# Patient Record
Sex: FEMALE | Race: WHITE | NOT HISPANIC OR LATINO | Employment: OTHER | URBAN - NONMETROPOLITAN AREA
[De-identification: names, ages, dates, MRNs, and addresses within clinical notes are randomized per-mention and may not be internally consistent; named-entity substitution may affect disease eponyms.]

---

## 2024-03-08 PROBLEM — E78.5 HYPERLIPIDEMIA: Status: ACTIVE | Noted: 2024-03-08

## 2024-03-08 PROBLEM — I49.3 PREMATURE VENTRICULAR CONTRACTIONS: Status: ACTIVE | Noted: 2024-03-08

## 2024-03-08 PROBLEM — I34.1 MITRAL VALVE PROLAPSE SYNDROME: Status: ACTIVE | Noted: 2024-03-08

## 2024-03-08 PROBLEM — I47.10 SUPRAVENTRICULAR TACHYCARDIA (CMS-HCC): Status: ACTIVE | Noted: 2024-03-08

## 2024-03-08 PROBLEM — I34.0 MITRAL REGURGITATION: Status: ACTIVE | Noted: 2024-03-08

## 2024-03-08 PROBLEM — I47.20 VENTRICULAR TACHYCARDIA (MULTI): Status: ACTIVE | Noted: 2024-03-08

## 2024-03-08 RX ORDER — ESTRADIOL 1 MG/1
1 TABLET ORAL DAILY
COMMUNITY
Start: 2021-12-15

## 2024-03-08 RX ORDER — SERTRALINE HYDROCHLORIDE 100 MG/1
1 TABLET, FILM COATED ORAL DAILY
COMMUNITY
Start: 2021-12-15

## 2024-03-08 RX ORDER — PROPRANOLOL HYDROCHLORIDE 80 MG/1
1 TABLET ORAL 2 TIMES DAILY
COMMUNITY
Start: 2022-01-24 | End: 2024-04-10

## 2024-03-08 RX ORDER — ATORVASTATIN CALCIUM 10 MG/1
1 TABLET, FILM COATED ORAL NIGHTLY
COMMUNITY
End: 2024-04-10

## 2024-04-10 DIAGNOSIS — I47.10 SUPRAVENTRICULAR TACHYCARDIA (CMS-HCC): ICD-10-CM

## 2024-04-10 DIAGNOSIS — E78.2 MIXED HYPERLIPIDEMIA: ICD-10-CM

## 2024-04-10 DIAGNOSIS — I49.3 PREMATURE VENTRICULAR CONTRACTIONS: ICD-10-CM

## 2024-04-10 RX ORDER — PROPRANOLOL HYDROCHLORIDE 80 MG/1
80 TABLET ORAL 2 TIMES DAILY
Qty: 180 TABLET | Refills: 3 | Status: SHIPPED | OUTPATIENT
Start: 2024-04-10

## 2024-04-10 RX ORDER — ATORVASTATIN CALCIUM 10 MG/1
10 TABLET, FILM COATED ORAL NIGHTLY
Qty: 90 TABLET | Refills: 3 | Status: SHIPPED | OUTPATIENT
Start: 2024-04-10

## 2024-04-16 ENCOUNTER — OFFICE VISIT (OUTPATIENT)
Dept: CARDIOLOGY | Facility: CLINIC | Age: 59
End: 2024-04-16
Payer: COMMERCIAL

## 2024-04-16 VITALS
SYSTOLIC BLOOD PRESSURE: 104 MMHG | BODY MASS INDEX: 24.59 KG/M2 | HEART RATE: 68 BPM | WEIGHT: 153 LBS | DIASTOLIC BLOOD PRESSURE: 66 MMHG | HEIGHT: 66 IN

## 2024-04-16 DIAGNOSIS — I34.1 MITRAL VALVE PROLAPSE SYNDROME: ICD-10-CM

## 2024-04-16 DIAGNOSIS — I49.3 PREMATURE VENTRICULAR CONTRACTIONS: ICD-10-CM

## 2024-04-16 DIAGNOSIS — Z87.891 FORMER SMOKER: ICD-10-CM

## 2024-04-16 DIAGNOSIS — E78.5 HYPERLIPIDEMIA, UNSPECIFIED HYPERLIPIDEMIA TYPE: ICD-10-CM

## 2024-04-16 DIAGNOSIS — I34.0 MITRAL VALVE INSUFFICIENCY, UNSPECIFIED ETIOLOGY: ICD-10-CM

## 2024-04-16 DIAGNOSIS — I47.20 VENTRICULAR TACHYCARDIA (MULTI): ICD-10-CM

## 2024-04-16 DIAGNOSIS — I47.10 SUPRAVENTRICULAR TACHYCARDIA (CMS-HCC): ICD-10-CM

## 2024-04-16 PROCEDURE — 1036F TOBACCO NON-USER: CPT | Performed by: INTERNAL MEDICINE

## 2024-04-16 PROCEDURE — 99213 OFFICE O/P EST LOW 20 MIN: CPT | Performed by: INTERNAL MEDICINE

## 2024-04-16 PROCEDURE — 3008F BODY MASS INDEX DOCD: CPT | Performed by: INTERNAL MEDICINE

## 2024-04-16 NOTE — LETTER
"April 16, 2024     Marine Peacock, APRN-CNP  [address]  [facility] [address]    Patient: Kristy Arnold   YOB: 1965   Date of Visit: 4/16/2024       Dear Dr. Marine Peacock, APRN-CNP:    Thank you for referring Kristy Arnold to me for evaluation. Below are my notes for this consultation.  If you have questions, please do not hesitate to call me. I look forward to following your patient along with you.       Sincerely,     Melinda Lugo MD      CC: No Recipients  ______________________________________________________________________________________    Subjective   Kristy Arnold is a 58 y.o. female       Chief Complaint    Annual Exam          HPI   Patient is in the office for follow-up for the problems noted below.  Since her last visit last year she has had no cardiac events whatsoever.  She has no palpitations chest pain or dyspnea.  Her examination today was completely normal.  I could not hear cardiac murmur.    ASSESSMENT AND PLAN:      1. Palpitations, on beta-blocker therapy under control.  2. History of supraventricular tachycardia, well controlled on propranolol.  3. Migraine, on propranolol under control.  4. Hyperlipidemia, on statin therapy. Profile is ordered  5. Mitral valve prolapse and mild mitral regurgitation.. Last echocardiogram 2022. Reassurances were provided, no cardiac murmurs or clicks could be heard on examination today       Melinda Lugo MD, [facility]   Review of Systems   All other systems reviewed and are negative.           Vitals:    04/16/24 0937   BP: 104/66   BP Location: Left arm   Patient Position: Sitting   Pulse: 68   Weight: 69.4 kg (153 lb)   Height: 1.676 m (5' 6\")        Objective   Physical Exam  Constitutional:       Appearance: Normal appearance.   HENT:      Nose: Nose normal.   Neck:      Vascular: No carotid bruit.   Cardiovascular:      Rate and Rhythm: Normal rate.      Pulses: Normal pulses.      Heart sounds: Normal heart " sounds.   Pulmonary:      Effort: Pulmonary effort is normal.   Abdominal:      General: Bowel sounds are normal.      Palpations: Abdomen is soft.   Musculoskeletal:         General: Normal range of motion.      Cervical back: Normal range of motion.      Right lower leg: No edema.      Left lower leg: No edema.   Skin:     General: Skin is warm and dry.   Neurological:      General: No focal deficit present.      Mental Status: She is alert.   Psychiatric:         Mood and Affect: Mood normal.         Behavior: Behavior normal.         Thought Content: Thought content normal.         Judgment: Judgment normal.         Allergies  Statins-hmg-coa reductase inhibitors, Ciprofloxacin, Penicillins, Sulfa (sulfonamide antibiotics), and Vancomycin     Current Medications    Current Outpatient Medications:   •  atorvastatin (Lipitor) 10 mg tablet, TAKE 1 TABLET AT BEDTIME, Disp: 90 tablet, Rfl: 3  •  estradiol (Estrace) 1 mg tablet, Take 1 tablet (1 mg) by mouth once daily., Disp: , Rfl:   •  propranolol (Inderal) 80 mg tablet, TAKE 1 TABLET TWICE A DAY, Disp: 180 tablet, Rfl: 3  •  sertraline (Zoloft) 100 mg tablet, Take 1 tablet (100 mg) by mouth once daily., Disp: , Rfl:                      Assessment/Plan   1. Supraventricular tachycardia (CMS-HCC)  Follow Up In Cardiology      2. Premature ventricular contractions        3. Ventricular tachycardia (Multi)        4. Mitral valve prolapse syndrome        5. Mitral valve insufficiency, unspecified etiology        6. Hyperlipidemia, unspecified hyperlipidemia type        7. BMI 24.0-24.9, adult        8. Former smoker                 Scribe Attestation  By signing my name below, Emely NUNEZ LPN, Scribe   attest that this documentation has been prepared under the direction and in the presence of Melinda Lugo MD.     Provider Attestation - Scribe documentation    All medical record entries made by the Scribe were at my direction and personally dictated by me. I  have reviewed the chart and agree that the record accurately reflects my personal performance of the history, physical exam, discussion and plan.

## 2024-04-16 NOTE — PATIENT INSTRUCTIONS
Please bring all medicines, vitamins, and herbal supplements with you when you come to the office.    Prescriptions will not be filled unless you are compliant with your follow up appointments or have a follow up appointment scheduled as per instruction of your physician. Refills should be requested at the time of your visit.     Follow up one year  Same medications.

## 2024-04-16 NOTE — PROGRESS NOTES
"Subjective   Kristy Arnold is a 58 y.o. female       Chief Complaint    Annual Exam          HPI   Patient is in the office for follow-up for the problems noted below.  Since her last visit last year she has had no cardiac events whatsoever.  She has no palpitations chest pain or dyspnea.  Her examination today was completely normal.  I could not hear cardiac murmur.    ASSESSMENT AND PLAN:      1. Palpitations, on beta-blocker therapy under control.  2. History of supraventricular tachycardia, well controlled on propranolol.  3. Migraine, on propranolol under control.  4. Hyperlipidemia, on statin therapy. Profile is ordered  5. Mitral valve prolapse and mild mitral regurgitation.. Last echocardiogram 2022. Reassurances were provided, no cardiac murmurs or clicks could be heard on examination today       Melinda Lugo MD, [facility]   Review of Systems   All other systems reviewed and are negative.           Vitals:    04/16/24 0937   BP: 104/66   BP Location: Left arm   Patient Position: Sitting   Pulse: 68   Weight: 69.4 kg (153 lb)   Height: 1.676 m (5' 6\")        Objective   Physical Exam  Constitutional:       Appearance: Normal appearance.   HENT:      Nose: Nose normal.   Neck:      Vascular: No carotid bruit.   Cardiovascular:      Rate and Rhythm: Normal rate.      Pulses: Normal pulses.      Heart sounds: Normal heart sounds.   Pulmonary:      Effort: Pulmonary effort is normal.   Abdominal:      General: Bowel sounds are normal.      Palpations: Abdomen is soft.   Musculoskeletal:         General: Normal range of motion.      Cervical back: Normal range of motion.      Right lower leg: No edema.      Left lower leg: No edema.   Skin:     General: Skin is warm and dry.   Neurological:      General: No focal deficit present.      Mental Status: She is alert.   Psychiatric:         Mood and Affect: Mood normal.         Behavior: Behavior normal.         Thought Content: Thought content normal.         " Judgment: Judgment normal.         Allergies  Statins-hmg-coa reductase inhibitors, Ciprofloxacin, Penicillins, Sulfa (sulfonamide antibiotics), and Vancomycin     Current Medications    Current Outpatient Medications:     atorvastatin (Lipitor) 10 mg tablet, TAKE 1 TABLET AT BEDTIME, Disp: 90 tablet, Rfl: 3    estradiol (Estrace) 1 mg tablet, Take 1 tablet (1 mg) by mouth once daily., Disp: , Rfl:     propranolol (Inderal) 80 mg tablet, TAKE 1 TABLET TWICE A DAY, Disp: 180 tablet, Rfl: 3    sertraline (Zoloft) 100 mg tablet, Take 1 tablet (100 mg) by mouth once daily., Disp: , Rfl:                      Assessment/Plan   1. Supraventricular tachycardia (CMS-HCC)  Follow Up In Cardiology      2. Premature ventricular contractions        3. Ventricular tachycardia (Multi)        4. Mitral valve prolapse syndrome        5. Mitral valve insufficiency, unspecified etiology        6. Hyperlipidemia, unspecified hyperlipidemia type        7. BMI 24.0-24.9, adult        8. Former smoker                 Scribe Attestation  By signing my name below, Emely NUNEZ LPN, Scribe   attest that this documentation has been prepared under the direction and in the presence of Melinda Lugo MD.     Provider Attestation - Scribe documentation    All medical record entries made by the Scribe were at my direction and personally dictated by me. I have reviewed the chart and agree that the record accurately reflects my personal performance of the history, physical exam, discussion and plan.

## 2024-09-09 ENCOUNTER — TELEPHONE (OUTPATIENT)
Dept: CARDIOLOGY | Facility: CLINIC | Age: 59
End: 2024-09-09
Payer: COMMERCIAL

## 2024-09-09 NOTE — TELEPHONE ENCOUNTER
----- Message from Melinda Lugo sent at 9/6/2024  8:16 PM EDT -----  Patient is clear with no reservations  ----- Message -----  From: Lauren Almaraz  Sent: 9/6/2024   1:04 PM EDT  To: Melinda Lugo MD

## 2024-10-30 ENCOUNTER — APPOINTMENT (OUTPATIENT)
Dept: ORTHOPEDIC SURGERY | Facility: CLINIC | Age: 59
End: 2024-10-30
Payer: COMMERCIAL

## 2024-11-04 ENCOUNTER — APPOINTMENT (OUTPATIENT)
Dept: ORTHOPEDIC SURGERY | Facility: CLINIC | Age: 59
End: 2024-11-04
Payer: COMMERCIAL

## 2024-11-04 ENCOUNTER — HOSPITAL ENCOUNTER (OUTPATIENT)
Dept: RADIOLOGY | Facility: HOSPITAL | Age: 59
Discharge: HOME | End: 2024-11-04
Payer: COMMERCIAL

## 2024-11-04 VITALS — HEIGHT: 67 IN | WEIGHT: 144 LBS | BODY MASS INDEX: 22.6 KG/M2

## 2024-11-04 DIAGNOSIS — M12.812 ROTATOR CUFF ARTHROPATHY OF LEFT SHOULDER: Primary | ICD-10-CM

## 2024-11-04 DIAGNOSIS — M25.512 LEFT SHOULDER PAIN, UNSPECIFIED CHRONICITY: ICD-10-CM

## 2024-11-04 PROCEDURE — 3008F BODY MASS INDEX DOCD: CPT | Performed by: ORTHOPAEDIC SURGERY

## 2024-11-04 PROCEDURE — 99204 OFFICE O/P NEW MOD 45 MIN: CPT | Performed by: ORTHOPAEDIC SURGERY

## 2024-11-04 PROCEDURE — 1036F TOBACCO NON-USER: CPT | Performed by: ORTHOPAEDIC SURGERY

## 2024-11-04 PROCEDURE — 73030 X-RAY EXAM OF SHOULDER: CPT | Mod: LEFT SIDE | Performed by: STUDENT IN AN ORGANIZED HEALTH CARE EDUCATION/TRAINING PROGRAM

## 2024-11-04 PROCEDURE — 73030 X-RAY EXAM OF SHOULDER: CPT | Mod: LT

## 2024-11-04 NOTE — PROGRESS NOTES
History of present: Complicated past history of left shoulder failed multiple surgeries    Most recently in 2021 she has failed a superior capsular reconstruction.    She has severe dysfunction pain at all times pain with ADLs and significantly lack of motion in the left arm        Past medical history:    The patient's past medical history, family history, social history and review of systems were documented on the patient's medical intake form.  The medical intake form was reviewed and scanned into the electronic medical record for future use.  History is otherwise negative except as stated in the history of present illness.    Physical exam:    General: Alert and oriented to person place and time.  No acute distress and breathing comfortably, pleasant and cooperative with examination.    Head and neck exam: Head is normocephalic atraumatic.    Neck: Supple no visible swelling or deformity.    Cardiovascular: Good perfusion to affected extremities without signs or symptoms of chest pain.    Lungs no audible wheezing or labored breathing on examination.    Abdominal exam: Nondistended nontender    Extremities: The left shoulder was inspected and was found to have no obvious deformity.  There was tenderness to touch over the lateral edges of the shoulder over the rotator cuff insertion.  Active forward flexion 50 degrees, external rotation to 20 degrees, abduction to 41 degrees, and internal rotation to the level of anterior iliac crest.    At this time the shoulder is neurovascular tact and neurosensory intact.  Motor intact C5-T1.  There was no obvious neck pain or radiculopathy noted.  There was no gross instability or adhesive capsulitis symptoms.  There was no evidence of apprehension or apprehension suppression.    Strength was tested in 4 planes with weakness in the supraspinatus strength testing and external rotation position.  There was no strength deficit in internal rotation.  Impingement signs were  positive both supine and standing for impingement test type I and II.  There was mild pain over the bicipital groove with a positive speeds sign        Diagnostic studies: X-rays show erosive change over the greater tuberosity cystic change in the humeral head elevation of the left humeral head in the glenoid fossa consistent with early rotator cuff arthropathy left shoulder      Impression: Left shoulder early rotator cuff arthropathy    The patient has failed extensive conservative treatment including multiple injections in the past and including arthroscopic superior capsular reconstruction with augmented graft      Plan: Now for reverse shoulder replacement    Risk and benefits of surgery discussed extensively with the patient.    Surgical risk included but were not limited to infection, wear, loosening, need for further surgery blood clot, failure to heal, failure of the surgery, stiffness, loss of limb life, extremity function change in length change, and associated risks of  any surgery.    Implant longevity rehab issues we talked about a lifetime lifting repetitive limit of 10 to 20 pounds    We talked about limited internal rotation crossarm activity goals benefits risk use and rehab program to look for to be on the operative schedule we also offered additional injections which she said worked very poorly in the past and she like to proceed with replacement

## 2024-11-15 ENCOUNTER — TELEPHONE (OUTPATIENT)
Dept: CARDIOLOGY | Facility: CLINIC | Age: 59
End: 2024-11-15
Payer: COMMERCIAL

## 2024-11-15 ENCOUNTER — TELEPHONE (OUTPATIENT)
Dept: ORTHOPEDIC SURGERY | Facility: CLINIC | Age: 59
End: 2024-11-15
Payer: COMMERCIAL

## 2024-11-15 DIAGNOSIS — M12.812 ROTATOR CUFF ARTHROPATHY OF LEFT SHOULDER: ICD-10-CM

## 2024-11-15 NOTE — TELEPHONE ENCOUNTER
Patient lvm asking if she could get a muscle relaxer prescribed, has been having a lot of spasms in her shoulder.    [facility], [facility] is her preferred pharmacy.

## 2024-11-15 NOTE — TELEPHONE ENCOUNTER
Patient phoned requesting POC for reverse total shoulder to be done by  on 1/7/25. Please advise.    To Dr. Melinda Lugo MD for review.

## 2024-11-16 RX ORDER — CYCLOBENZAPRINE HCL 10 MG
10 TABLET ORAL 3 TIMES DAILY PRN
Qty: 30 TABLET | Refills: 0 | Status: SHIPPED | OUTPATIENT
Start: 2024-11-16 | End: 2024-11-26

## 2024-12-23 ENCOUNTER — APPOINTMENT (OUTPATIENT)
Dept: CARDIOLOGY | Facility: CLINIC | Age: 59
End: 2024-12-23
Payer: COMMERCIAL

## 2024-12-23 ENCOUNTER — LAB (OUTPATIENT)
Dept: LAB | Facility: LAB | Age: 59
End: 2024-12-23
Payer: COMMERCIAL

## 2024-12-23 DIAGNOSIS — Z01.818 PRE-OP TESTING: ICD-10-CM

## 2024-12-23 LAB
ALBUMIN SERPL BCP-MCNC: 4.2 G/DL (ref 3.4–5)
ALP SERPL-CCNC: 55 U/L (ref 33–136)
ALT SERPL W P-5'-P-CCNC: 12 U/L (ref 7–45)
ANION GAP SERPL CALC-SCNC: 10 MMOL/L (ref 10–20)
AST SERPL W P-5'-P-CCNC: 14 U/L (ref 9–39)
BASOPHILS # BLD AUTO: 0.05 X10*3/UL (ref 0–0.1)
BASOPHILS NFR BLD AUTO: 1 %
BILIRUB SERPL-MCNC: 1.1 MG/DL (ref 0–1.2)
BUN SERPL-MCNC: 12 MG/DL (ref 6–23)
CALCIUM SERPL-MCNC: 9 MG/DL (ref 8.6–10.3)
CHLORIDE SERPL-SCNC: 107 MMOL/L (ref 98–107)
CO2 SERPL-SCNC: 28 MMOL/L (ref 21–32)
CREAT SERPL-MCNC: 0.75 MG/DL (ref 0.5–1.05)
EGFRCR SERPLBLD CKD-EPI 2021: >90 ML/MIN/1.73M*2
EOSINOPHIL # BLD AUTO: 0.06 X10*3/UL (ref 0–0.7)
EOSINOPHIL NFR BLD AUTO: 1.2 %
ERYTHROCYTE [DISTWIDTH] IN BLOOD BY AUTOMATED COUNT: 13 % (ref 11.5–14.5)
GLUCOSE SERPL-MCNC: 98 MG/DL (ref 74–99)
HCT VFR BLD AUTO: 38.7 % (ref 36–46)
HGB BLD-MCNC: 13.4 G/DL (ref 12–16)
IMM GRANULOCYTES # BLD AUTO: 0.01 X10*3/UL (ref 0–0.7)
IMM GRANULOCYTES NFR BLD AUTO: 0.2 % (ref 0–0.9)
INR PPP: 1 (ref 0.9–1.1)
LYMPHOCYTES # BLD AUTO: 1.6 X10*3/UL (ref 1.2–4.8)
LYMPHOCYTES NFR BLD AUTO: 30.8 %
MCH RBC QN AUTO: 30.7 PG (ref 26–34)
MCHC RBC AUTO-ENTMCNC: 34.6 G/DL (ref 32–36)
MCV RBC AUTO: 89 FL (ref 80–100)
MONOCYTES # BLD AUTO: 0.35 X10*3/UL (ref 0.1–1)
MONOCYTES NFR BLD AUTO: 6.7 %
NEUTROPHILS # BLD AUTO: 3.13 X10*3/UL (ref 1.2–7.7)
NEUTROPHILS NFR BLD AUTO: 60.1 %
NRBC BLD-RTO: 0 /100 WBCS (ref 0–0)
PLATELET # BLD AUTO: 208 X10*3/UL (ref 150–450)
POTASSIUM SERPL-SCNC: 4 MMOL/L (ref 3.5–5.3)
PROT SERPL-MCNC: 6.6 G/DL (ref 6.4–8.2)
PROTHROMBIN TIME: 10.7 SECONDS (ref 9.8–12.8)
RBC # BLD AUTO: 4.37 X10*6/UL (ref 4–5.2)
SODIUM SERPL-SCNC: 141 MMOL/L (ref 136–145)
WBC # BLD AUTO: 5.2 X10*3/UL (ref 4.4–11.3)

## 2024-12-23 PROCEDURE — 80053 COMPREHEN METABOLIC PANEL: CPT

## 2024-12-23 PROCEDURE — 85610 PROTHROMBIN TIME: CPT

## 2024-12-23 PROCEDURE — 85025 COMPLETE CBC W/AUTO DIFF WBC: CPT

## 2025-01-03 DIAGNOSIS — I47.10 SUPRAVENTRICULAR TACHYCARDIA (CMS-HCC): ICD-10-CM

## 2025-01-03 DIAGNOSIS — I49.3 PREMATURE VENTRICULAR CONTRACTIONS: ICD-10-CM

## 2025-01-03 DIAGNOSIS — E78.2 MIXED HYPERLIPIDEMIA: ICD-10-CM

## 2025-01-05 RX ORDER — ATORVASTATIN CALCIUM 10 MG/1
10 TABLET, FILM COATED ORAL NIGHTLY
Qty: 90 TABLET | Refills: 3 | Status: SHIPPED | OUTPATIENT
Start: 2025-01-05

## 2025-01-05 RX ORDER — PROPRANOLOL HYDROCHLORIDE 80 MG/1
80 TABLET ORAL 2 TIMES DAILY
Qty: 180 TABLET | Refills: 3 | Status: SHIPPED | OUTPATIENT
Start: 2025-01-05

## 2025-01-06 ENCOUNTER — APPOINTMENT (OUTPATIENT)
Dept: ORTHOPEDIC SURGERY | Facility: CLINIC | Age: 60
End: 2025-01-06
Payer: COMMERCIAL

## 2025-01-06 VITALS — WEIGHT: 148 LBS | HEIGHT: 66 IN | BODY MASS INDEX: 23.78 KG/M2

## 2025-01-06 DIAGNOSIS — Z96.612 S/P REVERSE TOTAL SHOULDER ARTHROPLASTY, LEFT: ICD-10-CM

## 2025-01-06 DIAGNOSIS — M12.812 ROTATOR CUFF ARTHROPATHY OF LEFT SHOULDER: Primary | ICD-10-CM

## 2025-01-06 DIAGNOSIS — M25.512 LEFT SHOULDER PAIN, UNSPECIFIED CHRONICITY: ICD-10-CM

## 2025-01-06 PROCEDURE — 3008F BODY MASS INDEX DOCD: CPT | Performed by: PHYSICIAN ASSISTANT

## 2025-01-06 PROCEDURE — L3670 SO ACRO/CLAV CAN WEB PRE OTS: HCPCS | Performed by: PHYSICIAN ASSISTANT

## 2025-01-06 PROCEDURE — 99024 POSTOP FOLLOW-UP VISIT: CPT | Performed by: PHYSICIAN ASSISTANT

## 2025-01-06 RX ORDER — ACETAMINOPHEN 325 MG/1
650 TABLET ORAL EVERY 6 HOURS PRN
Qty: 42 TABLET | Refills: 0 | Status: SHIPPED | OUTPATIENT
Start: 2025-01-06

## 2025-01-06 RX ORDER — OXYCODONE HYDROCHLORIDE 5 MG/1
5 TABLET ORAL EVERY 6 HOURS PRN
Qty: 28 TABLET | Refills: 0 | Status: SHIPPED | OUTPATIENT
Start: 2025-01-06 | End: 2025-01-13

## 2025-01-06 RX ORDER — CHLORHEXIDINE GLUCONATE 40 MG/ML
SOLUTION TOPICAL
Qty: 473 ML | Refills: 0 | Status: SHIPPED | OUTPATIENT
Start: 2025-01-06

## 2025-01-06 RX ORDER — CHLORHEXIDINE GLUCONATE ORAL RINSE 1.2 MG/ML
SOLUTION DENTAL
Qty: 30 ML | Refills: 0 | Status: SHIPPED | OUTPATIENT
Start: 2025-01-06

## 2025-01-06 RX ORDER — DOCUSATE SODIUM 100 MG/1
100 CAPSULE, LIQUID FILLED ORAL 2 TIMES DAILY PRN
Qty: 60 CAPSULE | Refills: 0 | Status: SHIPPED | OUTPATIENT
Start: 2025-01-06

## 2025-01-06 RX ORDER — ONDANSETRON 4 MG/1
4 TABLET, FILM COATED ORAL EVERY 8 HOURS PRN
Qty: 20 TABLET | Refills: 0 | Status: SHIPPED | OUTPATIENT
Start: 2025-01-06 | End: 2025-01-13

## 2025-01-06 RX ORDER — CYCLOBENZAPRINE HCL 10 MG
10 TABLET ORAL 3 TIMES DAILY PRN
Qty: 21 TABLET | Refills: 0 | Status: SHIPPED | OUTPATIENT
Start: 2025-01-06 | End: 2025-01-13

## 2025-01-06 NOTE — PROGRESS NOTES
History and Physical      CHIEF COMPLAINT: Left shoulder pain loss range of motion    HISTORY OF PRESENT ILLNESS:      The patient is a59 y.o. female with significant past medical history of left shoulder pain and loss of range of motion.  Patient has history of multiple shoulder arthroscopies and rotator cuff repair.  Diagnostic studies show failure of repair with elevation or humeral head.  Patient reports constant pain and loss of range of motion.  After risk benefits alternatives were discussed patient likes to proceed with left reverse total shoulder arthroplasty.      Past Medical History:  No past medical history on file.     Past Surgical History:    Past Surgical History:   Procedure Laterality Date    OTHER SURGICAL HISTORY  01/10/2022    Cataract surgery    OTHER SURGICAL HISTORY  01/10/2022    Cholecystectomy    OTHER SURGICAL HISTORY  01/10/2022    Complete colonoscopy    OTHER SURGICAL HISTORY  01/10/2022    Shoulder surgery    OTHER SURGICAL HISTORY  01/10/2022    Tonsillectomy    OTHER SURGICAL HISTORY  01/10/2022    Hysterectomy       Medications Prior to Admission:    Current Outpatient Medications on File Prior to Visit   Medication Sig Dispense Refill    atorvastatin (Lipitor) 10 mg tablet Take 1 tablet (10 mg) by mouth once daily at bedtime. 90 tablet 3    cyclobenzaprine (Flexeril) 10 mg tablet Take 1 tablet (10 mg) by mouth 3 times a day as needed for muscle spasms for up to 10 days. 30 tablet 0    estradiol (Estrace) 1 mg tablet Take 1 tablet (1 mg) by mouth once daily.      propranolol (Inderal) 80 mg tablet Take 1 tablet (80 mg) by mouth 2 times a day. 180 tablet 3    sertraline (Zoloft) 100 mg tablet Take 1 tablet (100 mg) by mouth once daily.      [DISCONTINUED] atorvastatin (Lipitor) 10 mg tablet TAKE 1 TABLET AT BEDTIME 90 tablet 3    [DISCONTINUED] propranolol (Inderal) 80 mg tablet TAKE 1 TABLET TWICE A  tablet 3     No current facility-administered medications on file  prior to visit.        Allergies:  Statins-hmg-coa reductase inhibitors, Ciprofloxacin, Penicillins, Sulfa (sulfonamide antibiotics), and Vancomycin    Social History:   Social History     Socioeconomic History    Marital status:      Spouse name: Not on file    Number of children: Not on file    Years of education: Not on file    Highest education level: Not on file   Occupational History    Not on file   Tobacco Use    Smoking status: Former     Current packs/day: 0.00     Types: Cigarettes     Quit date: 1/10/2022     Years since quittin.9    Smokeless tobacco: Never   Substance and Sexual Activity    Alcohol use: Yes     Comment: occasionally    Drug use: Never    Sexual activity: Not on file   Other Topics Concern    Not on file   Social History Narrative    Not on file     Social Drivers of Health     Financial Resource Strain: Patient Declined (9/10/2024)    Received from [facility]    Overall Financial Resource Strain (CARDIA)     Difficulty of Paying Living Expenses: Patient declined   Food Insecurity: Patient Declined (9/10/2024)    Received from [facility]    Hunger Vital Sign     Worried About Running Out of Food in the Last Year: Patient declined     Ran Out of Food in the Last Year: Patient declined   Transportation Needs: Patient Declined (9/10/2024)    Received from [facility]    PRAPARE - Transportation     Lack of Transportation (Medical): Patient declined     Lack of Transportation (Non-Medical): Patient declined   Physical Activity: Patient Declined (9/10/2024)    Received from [facility]    Exercise Vital Sign     Days of Exercise per Week: Patient declined     Minutes of Exercise per Session: Patient declined   Stress: Stress Concern Present (2023)    Received from [facility], [facility] [place] [facility] - Occupational Stress Questionnaire     Feeling of Stress : Very much   Social Connections: Unknown (9/10/2024)     "Received from [facility]    Social Connection and Isolation Panel [NHANES]     Frequency of Communication with Friends and Family: Patient declined     Frequency of Social Gatherings with Friends and Family: Patient declined     Attends [employer] Services: Patient declined     Active Member of Clubs or Organizations: Patient declined     Attends Club or Organization Meetings: Patient declined     Marital Status:    Intimate Partner Violence: Not At Risk (8/8/2023)    Received from [facility] [employer], [facility]    Humiliation, Afraid, Rape, and Kick questionnaire     Fear of Current or Ex-Partner: No     Emotionally Abused: No     Physically Abused: No     Sexually Abused: No   Housing Stability: Patient Declined (9/10/2024)    Received from [facility]    Housing Stability Vital Sign     Unable to Pay for Housing in the Last Year: Patient declined     Number of Times Moved in the Last Year: Not on file     Homeless in the Last Year: Patient declined       Family History:   Family History   Problem Relation Name Age of Onset    Atrial fibrillation Mother      Hyperlipidemia Mother      Hypertension Mother          Review of systems: Noncontributory for orthopedics    Vitals:  Ht 1.676 m (5' 6\")   Wt 67.1 kg (148 lb)   BMI 23.89 kg/m²     Physical examination:  Head normocephalic atraumatic  Heart regular rate and rhythm  Lungs clear  Abdominal exam nontender nondistended  Extremity: Left shoulder patient has forward flexion to 40 degrees abduction of 30 degrees external rotation of 40 degrees internal rotation to posterior iliac crest    Impression : Left shoulder degenerative joint disease and rotator cuff arthropathy      Plan:  Scheduled for left reverse total shoulder arthroplasty    Risk and benefits of surgery discussed extensively with the patient.    Surgical risk included but were not limited to infection, wear, loosening, need for further surgery blood clot, failure to heal, failure of " the surgery, stiffness, loss of limb life, extremity function change in length change, and associated risks of surgery during the coronavirus epidemic.    Risk with any surgery including arthroplasty and replacements include but are not limited to:       Wear, loosening, infection, blood clot, DVT, loss of limb, life, delayed recovery, limb length change, instability, dislocation, discomfort with new implant and failure of the procedure.

## 2025-01-07 ENCOUNTER — OUTSIDE PROCEDURE (OUTPATIENT)
Dept: ORTHOPEDIC SURGERY | Facility: CLINIC | Age: 60
End: 2025-01-07
Payer: COMMERCIAL

## 2025-01-07 PROCEDURE — 23472 RECONSTRUCT SHOULDER JOINT: CPT | Performed by: ORTHOPAEDIC SURGERY

## 2025-01-07 PROCEDURE — 23430 REPAIR BICEPS TENDON: CPT | Performed by: ORTHOPAEDIC SURGERY

## 2025-01-07 PROCEDURE — 23430 REPAIR BICEPS TENDON: CPT | Performed by: PHYSICIAN ASSISTANT

## 2025-01-07 PROCEDURE — 23472 RECONSTRUCT SHOULDER JOINT: CPT | Performed by: PHYSICIAN ASSISTANT

## 2025-01-07 NOTE — PROGRESS NOTES
Preop diagnosis: Left shoulder rotator cuff arthropathy    Postop diagnosis: Left shoulder rotator cuff arthropathy and long head bicep tendon tear    Procedure:  Left reverse total shoulder replacement  Left proximal long head bicep tenodesis    Surgeon:  Danyel Upton M.D.    Assistant:  Danyel Gannon physician assistant (PAC) was required and present throughout the entire case.  Given the nature of the disease process and the procedure, a skilled surgical first assistant was necessary during the entire case.  The assistant was necessary to hold retractors and manipulate extremity during the procedure.  A certified scrub tech was also present at the back table managing instruments with supplies for the surgical case      Anesthesia: General With a scalene block      Blood loss: Less than 50cc    Fluids: Less than 2 L       Indications: Patient with failed multiple rotator cuff repairs and also with failed superior capsular reconstruction patient separately developed cuff arthropathy near pseudoparalysis now for reverse shoulder replacement      Summation of event:    The patient was placed in the beachchair position and underwent routine prep and drape of the left shoulder  after induction of anesethia    Standard deltopectoral approach was used splitting skin for roughly 8-10 cm starting at the coracoid and extending down the arm.  Subcutaneous flaps were created and we exposed the deltopectoral interval.  Cephalic vein and deltoid were taken superior laterally and the pectoralis major released inferiorly.  We released part of the pectoralis off the humerus to aid with exposure.  We bluntly dissected under the conjoined tendon and placed a retractor medially against the conjoined tendon to protect the neurovascular structures.  Laterally we retracted off the deltoid attacking the vein.    The subscapularis partly intact.  The inferior two thirds was peeled off the lesser tuberosity.  It was  "tagged to be repaired later at the end of the case through if it hold the prosthesis with a #2 FiberWire.  There was severe end-stage arthrosis with significant bony erosion and posterior wear of the glenoid and humeral head.  There was absent supraspinatus tendon superiorly.  We proceeded with reverse total shoulder replacement      The long head of the biceps tendon inflamed and encased in a large synovial sheath.  Sheath was open the bicep tendon was frayed and torn as it entered the shoulder joint.  The bicep tendon was tenodesed to the anterior cortex of the humerus below the bicipital groove with a #2 FiberWire through a small drill hole.  This restored normal resting length and tension of the bicep construct.  The intra-articular portion of the bicep was discarded    The humeral head was easily dislocated at this time with external rotation.  The osteophytes were removed circumferentially around the humerus and we began hand reaming the humerus to a final diameter of 10.    Using the appropriate cutting guide the humeral head was cut at 20° retroversion and further osteophytes were cleaned removed posteriorly to aid in the exposure and implant insertion.    At this time retractors were placed on the posteriorly,  superiorly,  and anteriorly along the glenoid rim to allow for complete exposure to the glenoid face.   We then cleaned and removed degenerative labral tissue and old bicep tissue from around the rim of the glenoid to further provide exposure to the glenoid.     A centering pin was placed in the glenoid to correct version, a centering hole was then created and we planed the glenoid to a smooth flush surface.  After final preparation the baseplate was inserted with a 15 mm stem.  The purchase was excellent and we proceeded to fill the baseplate with 2 bicortical screw 36 mm and 40 mm  in length, and then placed  \"end caps\" on the screws so as to convert them to locking screws.  A 40 mm glenosphere " with 5 mm of lateral offset was fixed on the baseplate and its locking mechanism was also confirmed.    At this time final preparation of the humeral stem was completed and we inserted the 10 mm reverse compatible trabecular metal stem at 20° retroversion at the appropriate height and inclination.    We then trialed liners and after multiple reductions we placed a final buildup of 6 mm with a 60 degree constraint radius.    Once we completed the reduction of the shoulder, it was stable in all planes checked with no subcoracoid or subacromial impingement.  We lavaged and  irrigated and we closed what remnant of the subscapularis remained to  further enhance  stability.  Final  lavaging and irrigaion throughout the entire wound was completed and  hemostasis was confirmed.      We allow the deltopectoral interval to close loosely with 2-0 Vicryl, closure of the subcutaneous tissue with 2-0 Vicryl and a running 4-0 Monocryl was completed followed by application of a compressive dressing.  A drain was used if needed and the patient was taken to recovery room and an xr was taken and is pending review.

## 2025-01-20 ENCOUNTER — APPOINTMENT (OUTPATIENT)
Dept: ORTHOPEDIC SURGERY | Facility: CLINIC | Age: 60
End: 2025-01-20
Payer: COMMERCIAL

## 2025-01-20 ENCOUNTER — HOSPITAL ENCOUNTER (OUTPATIENT)
Dept: RADIOLOGY | Facility: HOSPITAL | Age: 60
Discharge: HOME | End: 2025-01-20
Payer: COMMERCIAL

## 2025-01-20 DIAGNOSIS — Z96.612 S/P REVERSE TOTAL SHOULDER ARTHROPLASTY, LEFT: ICD-10-CM

## 2025-01-20 DIAGNOSIS — M12.812 ROTATOR CUFF ARTHROPATHY OF LEFT SHOULDER: Primary | ICD-10-CM

## 2025-01-20 DIAGNOSIS — M62.838 MUSCLE SPASM: ICD-10-CM

## 2025-01-20 PROCEDURE — 73030 X-RAY EXAM OF SHOULDER: CPT | Mod: LT

## 2025-01-20 PROCEDURE — 73030 X-RAY EXAM OF SHOULDER: CPT | Mod: LEFT SIDE | Performed by: RADIOLOGY

## 2025-01-20 PROCEDURE — 99024 POSTOP FOLLOW-UP VISIT: CPT | Performed by: PHYSICIAN ASSISTANT

## 2025-01-20 RX ORDER — CYCLOBENZAPRINE HCL 5 MG
5 TABLET ORAL 3 TIMES DAILY PRN
Qty: 30 TABLET | Refills: 0 | Status: SHIPPED | OUTPATIENT
Start: 2025-01-20 | End: 2025-01-30

## 2025-01-20 NOTE — PROGRESS NOTES
History of present illness patient is 2 weeks status post left reverse total shoulder arthroplasty.  Patient presents in sling.  No complaints of pain.  Just significant muscle cramping in the deltoid especially at night.      Physical exam:      General: No acute distress or breathing difficulty or discomfort, pleasant and cooperative with the examination.    Extremities: Left shoulder incisions are clean dry well-healing and intact.  She has some diffuse ecchymosis which appears to be resolving.  She can perform hand wrist and elbow range of motion without difficulty      Diagnostic studies: X-rays 2 views left shoulder show well aligned position left reverse total shoulder arthroplasty    Impression: Status post left reverse total shoulder    Plan: Patient was instructed on gentle supine stretches which she may perform once daily.  We downsized and readjusted the sling.  Will provide a refill of muscle relaxer.  She will follow-up in 3 weeks with x-rays 2 views of the left shoulder and range of motion check

## 2025-01-21 DIAGNOSIS — M12.812 ROTATOR CUFF ARTHROPATHY OF LEFT SHOULDER: ICD-10-CM

## 2025-01-21 RX ORDER — OXYCODONE AND ACETAMINOPHEN 5; 325 MG/1; MG/1
1 TABLET ORAL EVERY 6 HOURS PRN
Qty: 28 TABLET | Refills: 0 | Status: SHIPPED | OUTPATIENT
Start: 2025-01-21 | End: 2025-01-28

## 2025-02-10 ENCOUNTER — OFFICE VISIT (OUTPATIENT)
Dept: ORTHOPEDIC SURGERY | Facility: CLINIC | Age: 60
End: 2025-02-10
Payer: COMMERCIAL

## 2025-02-10 ENCOUNTER — HOSPITAL ENCOUNTER (OUTPATIENT)
Dept: RADIOLOGY | Facility: CLINIC | Age: 60
Discharge: HOME | End: 2025-02-10
Payer: COMMERCIAL

## 2025-02-10 DIAGNOSIS — Z96.612 S/P REVERSE TOTAL SHOULDER ARTHROPLASTY, LEFT: Primary | ICD-10-CM

## 2025-02-10 DIAGNOSIS — M25.512 LEFT SHOULDER PAIN, UNSPECIFIED CHRONICITY: ICD-10-CM

## 2025-02-10 DIAGNOSIS — M62.838 MUSCLE SPASM: ICD-10-CM

## 2025-02-10 PROCEDURE — 73030 X-RAY EXAM OF SHOULDER: CPT | Mod: LEFT SIDE | Performed by: ORTHOPAEDIC SURGERY

## 2025-02-10 PROCEDURE — 99211 OFF/OP EST MAY X REQ PHY/QHP: CPT | Performed by: PHYSICIAN ASSISTANT

## 2025-02-10 PROCEDURE — 73030 X-RAY EXAM OF SHOULDER: CPT | Mod: LT

## 2025-02-10 RX ORDER — CYCLOBENZAPRINE HCL 10 MG
10 TABLET ORAL 3 TIMES DAILY PRN
Qty: 30 TABLET | Refills: 0 | Status: SHIPPED | OUTPATIENT
Start: 2025-02-10 | End: 2025-02-20

## 2025-02-10 RX ORDER — OXYCODONE AND ACETAMINOPHEN 5; 325 MG/1; MG/1
1 TABLET ORAL EVERY 6 HOURS PRN
Qty: 28 TABLET | Refills: 0 | Status: SHIPPED | OUTPATIENT
Start: 2025-02-10 | End: 2025-02-17

## 2025-02-10 NOTE — PROGRESS NOTES
History of present illness patient is 5 weeks status post left reverse total shoulder arthroplasty.  She does have nighttime soreness but overall is doing well.  She presents in sling.      Physical exam:      General: No acute distress or breathing difficulty or discomfort, pleasant and cooperative with the examination.    Extremities: Left shoulder incisions clean dry and intact.  With hands together she could forward flex to 100 degrees      Diagnostic studies: X-rays 2 views left shoulder show well and will position left reverse total shoulder arthroplasty they are read by Dr. Danyel Upton    Impression: Status post left reverse total shoulder arthroplasty    Plan: Patient no longer needs to wear the sling.  She will continue to perform gentle hand wrist and elbow range of motion and supine self-guided stretches.  She will follow-up in 4 to 5 weeks with x-rays 2 views left shoulder and range of motion check.

## 2025-03-17 ENCOUNTER — HOSPITAL ENCOUNTER (OUTPATIENT)
Dept: RADIOLOGY | Facility: HOSPITAL | Age: 60
Discharge: HOME | End: 2025-03-17
Payer: COMMERCIAL

## 2025-03-17 ENCOUNTER — APPOINTMENT (OUTPATIENT)
Dept: ORTHOPEDIC SURGERY | Facility: CLINIC | Age: 60
End: 2025-03-17
Payer: COMMERCIAL

## 2025-03-17 DIAGNOSIS — M25.512 LEFT SHOULDER PAIN, UNSPECIFIED CHRONICITY: ICD-10-CM

## 2025-03-17 DIAGNOSIS — M12.812 ROTATOR CUFF ARTHROPATHY OF LEFT SHOULDER: ICD-10-CM

## 2025-03-17 DIAGNOSIS — Z96.612 S/P REVERSE TOTAL SHOULDER ARTHROPLASTY, LEFT: Primary | ICD-10-CM

## 2025-03-17 PROCEDURE — 99024 POSTOP FOLLOW-UP VISIT: CPT | Performed by: ORTHOPAEDIC SURGERY

## 2025-03-17 PROCEDURE — 73030 X-RAY EXAM OF SHOULDER: CPT | Mod: LEFT SIDE | Performed by: RADIOLOGY

## 2025-03-17 PROCEDURE — 73030 X-RAY EXAM OF SHOULDER: CPT | Mod: LT

## 2025-03-17 NOTE — PROGRESS NOTES
History of present illness: 9 to 10 weeks out left total shoulder reverse doing better    Physical exam:    General: No acute distress or breathing difficulty or discomfort, pleasant and cooperative with the examination.    Extremities: Exam shows resolution of ecchymosis swelling and edema incisions clean and intact she still a little bit stiff    She can flex to about 100 abduct to 40 exenterate to 40 she still limited in internal rotation ecchymosis bruising swelling edema as stated has resolved swelling and healing is within normal limits incisions clean and dry motor intact C5-T1    Diagnostic studies: X-rays show well-positioned left reverse total shoulder    Impression: Left reverse total shoulder but some stiffness    Plan: More aggressive therapy supine and upright we instructed her in exercises 2 to 4 pound resistance work is allowed she understands the normal routine precautions and restrictions of reverse total shoulder specifically with crossarm activity and internal rotation I will see her back in 5 to 6 weeks with range of motion check and x-rays if she does not loosen up we may order some formal therapy but I think she is motivated understands the program and we will see where she is regard to range of motion when she returns

## 2025-04-16 ENCOUNTER — APPOINTMENT (OUTPATIENT)
Dept: CARDIOLOGY | Facility: CLINIC | Age: 60
End: 2025-04-16
Payer: COMMERCIAL

## 2025-04-16 VITALS
WEIGHT: 154 LBS | HEIGHT: 66 IN | HEART RATE: 72 BPM | SYSTOLIC BLOOD PRESSURE: 112 MMHG | DIASTOLIC BLOOD PRESSURE: 82 MMHG | BODY MASS INDEX: 24.75 KG/M2

## 2025-04-16 DIAGNOSIS — I49.3 PREMATURE VENTRICULAR CONTRACTIONS: ICD-10-CM

## 2025-04-16 DIAGNOSIS — E78.5 HYPERLIPIDEMIA, UNSPECIFIED HYPERLIPIDEMIA TYPE: ICD-10-CM

## 2025-04-16 DIAGNOSIS — I34.1 MITRAL VALVE PROLAPSE SYNDROME: ICD-10-CM

## 2025-04-16 DIAGNOSIS — Z87.891 FORMER SMOKER: ICD-10-CM

## 2025-04-16 DIAGNOSIS — I47.10 SUPRAVENTRICULAR TACHYCARDIA: ICD-10-CM

## 2025-04-16 DIAGNOSIS — I34.0 MITRAL VALVE INSUFFICIENCY, UNSPECIFIED ETIOLOGY: ICD-10-CM

## 2025-04-16 PROCEDURE — 1036F TOBACCO NON-USER: CPT | Performed by: INTERNAL MEDICINE

## 2025-04-16 PROCEDURE — 99213 OFFICE O/P EST LOW 20 MIN: CPT | Performed by: INTERNAL MEDICINE

## 2025-04-16 PROCEDURE — 3008F BODY MASS INDEX DOCD: CPT | Performed by: INTERNAL MEDICINE

## 2025-04-16 ASSESSMENT — ENCOUNTER SYMPTOMS: PALPITATIONS: 1

## 2025-04-16 NOTE — PROGRESS NOTES
"Chief Complaint   Patient presents with    Annual Exam     1 year follow up supraventricular tachycardia       Subjective   Kristy Arnold is a 59 y.o. female     HPI     Patient is in the office for follow-up for palpitations and history of ectopic beats and SVT with mild mitral valve regurgitation.  Since her last visit last year she has had frequent episodes of palpitations which are random but no dizziness syncope or near syncope.  She is compliant medical therapy as prescribed.  She maintains active lifestyle.  Her BMI is on target.  Examination was normal.  No cardiac murmurs could be heard.  Lab data since last visit were reviewed and shared with her.  There is no concern noted.    ASSESSMENT AND PLAN:      1. Palpitations, on beta-blocker therapy with propranolol under control.  2. History of supraventricular tachycardia, well controlled on propranolol.  3. Hyperlipidemia, on statin therapy.  No side effects  4. Mitral valve prolapse and mild mitral regurgitation.. Last echocardiogram 2022. Reassurances were provided, no cardiac murmurs or clicks could be heard on examination today  Review of Systems   Cardiovascular:  Positive for palpitations.            Vitals:    04/16/25 0958   BP: 112/82   BP Location: Right arm   Patient Position: Sitting   Pulse: 72   Weight: 69.9 kg (154 lb)   Height: 1.676 m (5' 6\")        Objective   Physical Exam  Constitutional:       Appearance: Normal appearance.   HENT:      Nose: Nose normal.   Neck:      Vascular: No carotid bruit.   Cardiovascular:      Rate and Rhythm: Normal rate.      Pulses: Normal pulses.      Heart sounds: Normal heart sounds.   Pulmonary:      Effort: Pulmonary effort is normal.   Abdominal:      General: Bowel sounds are normal.      Palpations: Abdomen is soft.   Musculoskeletal:         General: Normal range of motion.      Cervical back: Normal range of motion.      Right lower leg: No edema.      Left lower leg: No edema.   Skin:     General: " Skin is warm and dry.   Neurological:      General: No focal deficit present.      Mental Status: She is alert.   Psychiatric:         Mood and Affect: Mood normal.         Behavior: Behavior normal.         Thought Content: Thought content normal.         Judgment: Judgment normal.         Allergies  Statins-hmg-coa reductase inhibitors, Ciprofloxacin, Penicillins, Sulfa (sulfonamide antibiotics), and Vancomycin     Current Medications  Current Outpatient Medications   Medication Instructions    atorvastatin (LIPITOR) 10 mg, oral, Nightly    cyclobenzaprine (FLEXERIL) 5 mg, oral, 3 times daily PRN    estradiol (Estrace) 1 mg tablet 1 tablet, Daily    propranolol (INDERAL) 80 mg, oral, 2 times daily    sertraline (Zoloft) 100 mg tablet 1 tablet, Daily                        Assessment/Plan   1. Supraventricular tachycardia  Follow Up In Cardiology    Follow Up In Cardiology      2. Premature ventricular contractions        3. Mitral valve insufficiency, unspecified etiology        4. Mitral valve prolapse syndrome        5. Hyperlipidemia, unspecified hyperlipidemia type        6. BMI 24.0-24.9, adult        7. Former smoker                 Scribe Attestation  By signing my name below, Emely NUNEZ LPN, Scribe   attest that this documentation has been prepared under the direction and in the presence of Melinda Lugo MD.     Provider Attestation - Scribe documentation    All medical record entries made by the Scribe were at my direction and personally dictated by me. I have reviewed the chart and agree that the record accurately reflects my personal performance of the history, physical exam, discussion and plan.

## 2025-04-16 NOTE — PATIENT INSTRUCTIONS
Please bring all medicines, vitamins, and herbal supplements with you when you come to the office.    Prescriptions will not be filled unless you are compliant with your follow up appointments or have a follow up appointment scheduled as per instruction of your physician. Refills should be requested at the time of your visit.     One year  Same medications.

## 2025-04-16 NOTE — LETTER
"April 16, 2025     Marine Peacock, APRN-CNP  [address]  [facility] [address]    Patient: ERVIN Arnold   YOB: 1965   Date of Visit: 4/16/2025       Dear Dr. Marine Peacock, APRN-CNP:    Thank you for referring ERVIN Arnold to me for evaluation. Below are my notes for this consultation.  If you have questions, please do not hesitate to call me. I look forward to following your patient along with you.       Sincerely,     Melinda Lugo MD      CC: No Recipients  ______________________________________________________________________________________    Chief Complaint   Patient presents with   • Annual Exam     1 year follow up supraventricular tachycardia       Subjective   Ervin Arnold is a 59 y.o. female     HPI     Patient is in the office for follow-up for palpitations and history of ectopic beats and SVT with mild mitral valve regurgitation.  Since her last visit last year she has had frequent episodes of palpitations which are random but no dizziness syncope or near syncope.  She is compliant medical therapy as prescribed.  She maintains active lifestyle.  Her BMI is on target.  Examination was normal.  No cardiac murmurs could be heard.  Lab data since last visit were reviewed and shared with her.  There is no concern noted.    ASSESSMENT AND PLAN:      1. Palpitations, on beta-blocker therapy with propranolol under control.  2. History of supraventricular tachycardia, well controlled on propranolol.  3. Hyperlipidemia, on statin therapy.  No side effects  4. Mitral valve prolapse and mild mitral regurgitation.. Last echocardiogram 2022. Reassurances were provided, no cardiac murmurs or clicks could be heard on examination today  Review of Systems   Cardiovascular:  Positive for palpitations.            Vitals:    04/16/25 0958   BP: 112/82   BP Location: Right arm   Patient Position: Sitting   Pulse: 72   Weight: 69.9 kg (154 lb)   Height: 1.676 m (5' 6\")        Objective "   Physical Exam  Constitutional:       Appearance: Normal appearance.   HENT:      Nose: Nose normal.   Neck:      Vascular: No carotid bruit.   Cardiovascular:      Rate and Rhythm: Normal rate.      Pulses: Normal pulses.      Heart sounds: Normal heart sounds.   Pulmonary:      Effort: Pulmonary effort is normal.   Abdominal:      General: Bowel sounds are normal.      Palpations: Abdomen is soft.   Musculoskeletal:         General: Normal range of motion.      Cervical back: Normal range of motion.      Right lower leg: No edema.      Left lower leg: No edema.   Skin:     General: Skin is warm and dry.   Neurological:      General: No focal deficit present.      Mental Status: She is alert.   Psychiatric:         Mood and Affect: Mood normal.         Behavior: Behavior normal.         Thought Content: Thought content normal.         Judgment: Judgment normal.         Allergies  Statins-hmg-coa reductase inhibitors, Ciprofloxacin, Penicillins, Sulfa (sulfonamide antibiotics), and Vancomycin     Current Medications  Current Outpatient Medications   Medication Instructions   • atorvastatin (LIPITOR) 10 mg, oral, Nightly   • cyclobenzaprine (FLEXERIL) 5 mg, oral, 3 times daily PRN   • estradiol (Estrace) 1 mg tablet 1 tablet, Daily   • propranolol (INDERAL) 80 mg, oral, 2 times daily   • sertraline (Zoloft) 100 mg tablet 1 tablet, Daily                        Assessment/Plan   1. Supraventricular tachycardia  Follow Up In Cardiology    Follow Up In Cardiology      2. Premature ventricular contractions        3. Mitral valve insufficiency, unspecified etiology        4. Mitral valve prolapse syndrome        5. Hyperlipidemia, unspecified hyperlipidemia type        6. BMI 24.0-24.9, adult        7. Former smoker                 Scribe Attestation  By signing my name below, Emely NUNEZ LPN   , Scribe   attest that this documentation has been prepared under the direction and in the presence of Melinda Lugo MD.      Provider Attestation - Scribe documentation    All medical record entries made by the Scribe were at my direction and personally dictated by me. I have reviewed the chart and agree that the record accurately reflects my personal performance of the history, physical exam, discussion and plan.

## 2025-04-21 ENCOUNTER — APPOINTMENT (OUTPATIENT)
Dept: ORTHOPEDIC SURGERY | Facility: CLINIC | Age: 60
End: 2025-04-21
Payer: COMMERCIAL

## 2025-04-21 ENCOUNTER — HOSPITAL ENCOUNTER (OUTPATIENT)
Dept: RADIOLOGY | Facility: HOSPITAL | Age: 60
Discharge: HOME | End: 2025-04-21
Payer: COMMERCIAL

## 2025-04-21 DIAGNOSIS — Z96.612 S/P REVERSE TOTAL SHOULDER ARTHROPLASTY, LEFT: ICD-10-CM

## 2025-04-21 PROCEDURE — 73030 X-RAY EXAM OF SHOULDER: CPT | Mod: LEFT SIDE | Performed by: RADIOLOGY

## 2025-04-21 PROCEDURE — 73030 X-RAY EXAM OF SHOULDER: CPT | Mod: LT

## 2025-04-21 PROCEDURE — 99024 POSTOP FOLLOW-UP VISIT: CPT | Performed by: ORTHOPAEDIC SURGERY

## 2025-04-21 NOTE — PROGRESS NOTES
History of present illness patient has history of reverse total shoulder arthroplasty left shoulder.  Overall she is still very happy with her result.  She continues to make progress and continues to stretch daily.      Physical exam:      General: No acute distress or breathing difficulty or discomfort, pleasant and cooperative with the examination.    Extremities: Left shoulder incisions clean dry well-healed and intact.  She has forward flexion up to 120 degrees abduction of 70 degrees external rotation of 50 degrees internal rotation to posterior iliac crest      Diagnostic studies: X-rays 2 views left shoulder show well aligned will position left reverse total shoulder arthroplasty    Impression: Status post left reverse total shoulder arthroplasty    Plan: Overall she is doing extremely well.  She has a history of a failed SCR and so was not able to move her arm for several years.  So she is very happy with the range of motion at this point.  She will continue to work on stretching and conditioning and can start swimming in her pool.

## 2026-04-16 ENCOUNTER — APPOINTMENT (OUTPATIENT)
Dept: CARDIOLOGY | Facility: CLINIC | Age: 61
End: 2026-04-16
Payer: COMMERCIAL